# Patient Record
Sex: FEMALE | Race: WHITE | NOT HISPANIC OR LATINO | URBAN - METROPOLITAN AREA
[De-identification: names, ages, dates, MRNs, and addresses within clinical notes are randomized per-mention and may not be internally consistent; named-entity substitution may affect disease eponyms.]

---

## 2024-07-19 ENCOUNTER — APPOINTMENT (OUTPATIENT)
Dept: DERMATOLOGY | Facility: CLINIC | Age: 74
End: 2024-07-19
Payer: MEDICARE

## 2024-07-19 VITALS — HEART RATE: 73 BPM | SYSTOLIC BLOOD PRESSURE: 117 MMHG | DIASTOLIC BLOOD PRESSURE: 75 MMHG

## 2024-07-19 DIAGNOSIS — C44.319 BASAL CELL CARCINOMA OF LEFT CHEEK: ICD-10-CM

## 2024-07-19 RX ORDER — FLUOROURACIL 50 MG/G
1 CREAM TOPICAL 2 TIMES DAILY
COMMUNITY

## 2024-07-19 NOTE — LETTER
[facility] Provider/Referral Letter Treatment Plan    Patient: Ashley Bryant   YOB: 1950   Date of Visit: 7/19/2024   MRN: [id number]     Ashlyn Dueñas MD  [address] [facility] [address]  [place] [address]    Dear Ashlyn Dueñas MD,     I had the pleasure of seeing Ashley Bryant today in consultation at your request for evaluation and treatment of:  1. Basal cell carcinoma of left cheek  Left Malar Cheek    Mohs surgery    Staff Communication: Dermatology Local Anesthesia: Site Location: Left Malar Cheek 1 % Lidocaine / Epinephrine - Amount: 2.2cc      Mohs surgery was indicated because of the nature of the lesion and the need to obtain the highest cure rate.  After informed consent was obtained, the patient underwent the procedure without complication.    The skin cancer was removed, wound care instructions were given and the patient was advised to follow up with you.  I will see the patient post-operatively as indicated.    Thank you very much for your confidence in me and for allowing me to share in the care of this patient.    1. Basal cell carcinoma of left cheek  Left Malar Cheek  Is a 0.3 x 0.2 cm scar                                          Mohs surgery    Consent obtained: written    Universal Protocol:  Procedure explained and questions answered to patient or proxy's satisfaction: Yes    Test results available and properly labeled: Yes    Pathology report reviewed: Yes    External notes reviewed: Yes    Photo or diagram used for site identification: Yes    Site/side marked: Yes    Slide independently reviewed by Mohs surgeon: Yes    Immediately prior to procedure a time out was called: Yes    Patient identity confirmed: verbally with patient  Preparation: Patient was prepped and draped in usual sterile fashion      Anticoagulation:  Is the patient taking prescription anticoagulant and/or aspirin prescribed/recommended by a physician? No    Was the anticoagulation regimen changed prior to  Mohs? No      Anesthesia:  Anesthesia method: local infiltration  Local anesthetic: lidocaine 1% WITH epi and sodium bicarbonate    Procedure Details:  Biopsy accession number: [id number]  Date of biopsy: 5/9/2024  Frozen section biopsy performed: No    Specimen debulked: Yes    Pre-Op diagnosis: basal cell carcinoma  BCC subtype: nodular  Surgery side: left  Surgical site (from skin exam): Left Malar Cheek  Pre-operative length (cm): 0.3  Pre-operative width (cm): 0.2  Indications for Mohs surgery: anatomic location where tissue conservation is critical  Previously treated? No      Micrographic Surgery Details:  Post-operative length (cm): 0.6  Post-operative width (cm): 0.6  Number of Mohs stages: 2  Is this a complex case (associate members only): No      Stage 1     Comments: The patient was brought into the operating room and placed in the procedure chair in the appropriate position.  The area positive by previous biopsy was identified and confirmed with the patient. The area of clinically obvious tumor was debulked using a curette and/or scalpel as needed. An incision was made following the Mohs approach through the skin. The specimen was taken to the lab, divided into 2 piece(s) and appropriately chromacoded and processed.    .  Tumor was seen on the lateral margins as indicated on the on the Mohs map.  Nodular basal cell carcinoma. Histologic examination revealed large tumor aggregates of atypical basaloid cells with peripheral palisading and tumoral clefting.              Tumor features identified on Mohs section: basal carcinoma    Stage 2     Comments: The area of positivity as noted on the Mohs map in the previous stage was identified and removed using the Mohs technique. The specimen was taken to the lab and appropriately chromacoded and processed in 2 piece(s).               Tumor features identified on Mohs section: no tumor identified    Depth of defect: subcutaneous fat    Patient tolerance of  procedure: tolerated well, no immediate complications    Reconstruction:  Was the defect reconstructed? Yes    Was reconstruction performed by the same Mohs surgeon? Yes    Setting of reconstruction: outpatient office  When was reconstruction performed? same day  Type of reconstruction: linear  Linear reconstruction: complex  Length of linear repair (cm): 2  Subcutaneous Layers (Deep Stitches)   Suture size:  5-0  Suture type:  Vicryl  Stitches:  Buried vertical mattress  Fine/surface layer approximation (top stitches)   Epidermal/Superficial suture size:  5-0  Epidermal/Superficial suture type:  Fast-absorbing gut  Stitches: simple running    Hemostasis achieved with: electrodesiccation  Outcome: patient tolerated procedure well with no complications    Post-procedure details: sterile dressing applied and wound care instructions given    Dressing type: petrolatum, Telfa pad, pressure dressing and Hypafix      Staff Communication: Dermatology Local Anesthesia: Site Location: Left Malar Cheek 1 % Lidocaine / Epinephrine - Amount: 2.2cc           Sincerely,       Drew Alcantara MD PhD  [facility]

## 2024-07-19 NOTE — PROGRESS NOTES
Mohs Surgery Operative Note    Date of Surgery:  7/19/2024  Surgeon:  Drew Alcantara MD PhD  Office Location: [address]  [address]  [facility] [address]  Dept: [phone]  Dept Fax: [phone]  Referring Provider: Ashlyn Dueñas MD  [address]  [place] [address]      Assessment/Plan   Pre-procedure:   Obtained informed consent: written from patient  The surgical site was identified and confirmed with the patient.     Intra-operative:   Audible time out called at : 11:19 AM 07/19/24  by: Fer Yang MA   Verified patient name, birthdate, site, specimen bottle label & requisition.    The planned procedure(s) was again reviewed with the patient. The risks of bleeding, infection, nerve damage and scarring were reviewed. Written authorization was obtained. The patient identity, surgical site, and planned procedure(s) were verified. The provider acted as both surgeon and pathologist.     Basal cell carcinoma of left cheek  Left Malar Cheek    Mohs surgery    Consent obtained: written    Universal Protocol:  Procedure explained and questions answered to patient or proxy's satisfaction: Yes    Test results available and properly labeled: Yes    Pathology report reviewed: Yes    External notes reviewed: Yes    Photo or diagram used for site identification: Yes    Site/side marked: Yes    Slide independently reviewed by Mohs surgeon: Yes    Immediately prior to procedure a time out was called: Yes    Patient identity confirmed: verbally with patient  Preparation: Patient was prepped and draped in usual sterile fashion      Anticoagulation:  Is the patient taking prescription anticoagulant and/or aspirin prescribed/recommended by a physician? No    Was the anticoagulation regimen changed prior to Mohs? No      Anesthesia:  Anesthesia method: local infiltration  Local anesthetic: lidocaine 1% WITH epi and sodium bicarbonate    Procedure Details:  Biopsy  accession number: [id number]  Date of biopsy: 5/9/2024  Frozen section biopsy performed: No    Specimen debulked: Yes    Pre-Op diagnosis: basal cell carcinoma  BCC subtype: nodular  Surgery side: left  Surgical site (from skin exam): Left Malar Cheek  Pre-operative length (cm): 0.3  Pre-operative width (cm): 0.2  Indications for Mohs surgery: anatomic location where tissue conservation is critical  Previously treated? No      Micrographic Surgery Details:  Post-operative length (cm): 0.6  Post-operative width (cm): 0.6  Number of Mohs stages: 2  Is this a complex case (associate members only): No      Stage 1     Comments: The patient was brought into the operating room and placed in the procedure chair in the appropriate position.  The area positive by previous biopsy was identified and confirmed with the patient. The area of clinically obvious tumor was debulked using a curette and/or scalpel as needed. An incision was made following the Mohs approach through the skin. The specimen was taken to the lab, divided into 2 piece(s) and appropriately chromacoded and processed.    .  Scar was seen on the lateral margins as indicated on the on the Mohs map.    Stage 2     Comments: The area of positivity as noted on the Mohs map in the previous stage was identified and removed using the Mohs technique. The specimen was taken to the lab and appropriately chromacoded and processed in 2 piece(s).      Tumor features identified on Mohs section: no tumor identified    Depth of defect: subcutaneous fat    Patient tolerance of procedure: tolerated well, no immediate complications    Reconstruction:  Was the defect reconstructed? Yes    Was reconstruction performed by the same Mohs surgeon? Yes    Setting of reconstruction: outpatient office  When was reconstruction performed? same day  Type of reconstruction: linear  Linear reconstruction: complex  Length of linear repair (cm): 2  Subcutaneous Layers (Deep Stitches)   Suture  size:  5-0  Suture type:  Vicryl  Stitches:  Buried vertical mattress  Fine/surface layer approximation (top stitches)   Epidermal/Superficial suture size:  5-0  Epidermal/Superficial suture type:  Fast-absorbing gut  Stitches: simple running    Hemostasis achieved with: electrodesiccation  Outcome: patient tolerated procedure well with no complications    Post-procedure details: sterile dressing applied and wound care instructions given    Dressing type: petrolatum, Telfa pad, pressure dressing and Hypafix          Complex Linear Repair - Wide Undermining:  Given the location and size of the defect, it was determined that a complex layered linear closure was required to restore normal anatomy and function. The repair was considered complex because extensive undermining was required and performed. The amount of undermining performed was greater than the maximum width of the defect as measured perpendicular to the closure line along at least one entire edge of the defect. Standing cutaneous cones were removed using Burow's triangles. The wound edges were brought into close approximation with buried vertical mattress sutures. The remainder of the wound was then closed with epidermal top sutures.    The final repair measured 2.0 cm    Wound care was discussed, and the patient was given written post-operative wound care instructions.      The patient will follow up with Drew Alcantara MD PhD as needed for any post operative problems or concerns, and will follow up with their primary dermatologist as scheduled.

## 2024-07-19 NOTE — PROGRESS NOTES
Office Visit Note  Date: 7/19/2024  Surgeon:  Drew Alcantara MD PhD  Office Location: [address]  [address]  [facility] [address]  Dept: [phone]  Dept Fax: [phone]  Referring Provider: Ashlyn Dueñas MD  [address]  [place] [address]    Subjective   Ashley Bryant is a 73 y.o. female who presents for the following: MOHS Surgery (Left Malar Cheek, Basal Cell Carcinoma)    According to the patient, the lesion has been present for approximately 6 months at the time of diagnosis.  The lesion is not causing symptoms.  The lesion has not been treated previously.    The patient does not have a pacemaker / defibrillator.  The patient does not have a heart valve / joint replacement.    The patient is not on blood thinners.  The patient does not have a history of hepatitis B or C.  The patient does not have a history of HIV.  The patient does not have a history of immunosuppression (e.g. organ transplantation, malignancy, medications)    Review of Systems:  No other skin or systemic complaints other than what is documented elsewhere in the note.    MEDICAL HISTORY: clinically relevant history including significant past medical history, medications and allergies was reviewed and documented in Epic.    Objective   Well appearing patient in no apparent distress; mood and affect are within normal limits.  Vital signs: See record.  Noted on the Left Malar Cheek  Is a 0.3 x 0.2 cm scar                                      The patient confirmed the identified site.    Discussion:  The nature of the diagnosis was explained. The lesion is a skin cancer.  It has a risk of local growth and distant spread. The condition is associated with sun exposure.  Warning signs of non-melanoma skin cancer discussed. Patient was instructed to perform monthly self skin examination.  We recommended that the patient have regular full skin exams given an increased risk of subsequent  skin cancers. The patient was instructed to use sun protective behaviors including use of broad spectrum sunscreens and sun protective clothing to reduce risk of skin cancers.      Risks, benefits, side effects of Mohs surgery were discussed with patient and the patient voiced understanding.  It was explained that even though the cure rate of Mohs is very high it is not 100%. Risks of surgery including but not limited to bleeding, infection, numbness, nerve damage, and scar were reviewed.  Discussion included wound care requirements, activity restrictions, likely scar outcome and time to heal.     After Mohs surgery, the defect may need to be repaired surgically and the scar may be longer than the original lesion.  Reconstruction options, risks, and benefits were reviewed including second intention healing, linear repair (4-1 ratio was explained), local flaps, skin grafts, cartilage grafts and interpolation flaps (the need for multiple surgeries was explained). Possible outcomes were reviewed including likely scar appearance, failure of flap survival, infection, bleeding and the need for revision surgery.     The pathology was reviewed, the photograph was reviewed, and the referring physician's note was reviewed.    Patient elected for Mohs surgery.